# Patient Record
Sex: FEMALE | Race: WHITE | Employment: UNEMPLOYED | URBAN - METROPOLITAN AREA
[De-identification: names, ages, dates, MRNs, and addresses within clinical notes are randomized per-mention and may not be internally consistent; named-entity substitution may affect disease eponyms.]

---

## 2018-10-23 ENCOUNTER — HOSPITAL ENCOUNTER (INPATIENT)
Facility: CLINIC | Age: 18
LOS: 2 days | Discharge: HOME | DRG: 637 | End: 2018-10-25
Attending: EMERGENCY MEDICINE | Admitting: INTERNAL MEDICINE
Payer: COMMERCIAL

## 2018-10-23 ENCOUNTER — APPOINTMENT (OUTPATIENT)
Dept: GENERAL RADIOLOGY | Facility: CLINIC | Age: 18
DRG: 637 | End: 2018-10-23
Attending: EMERGENCY MEDICINE
Payer: COMMERCIAL

## 2018-10-23 DIAGNOSIS — E10.10 DIABETIC KETOACIDOSIS WITHOUT COMA ASSOCIATED WITH TYPE 1 DIABETES MELLITUS (H): ICD-10-CM

## 2018-10-23 DIAGNOSIS — L20.81 ATOPIC NEURODERMATITIS: Primary | ICD-10-CM

## 2018-10-23 DIAGNOSIS — E87.6 HYPOKALEMIA: ICD-10-CM

## 2018-10-23 DIAGNOSIS — B37.31 CANDIDIASIS OF VAGINA: ICD-10-CM

## 2018-10-23 PROBLEM — D72.829 LEUKOCYTOSIS: Status: ACTIVE | Noted: 2018-10-23

## 2018-10-23 PROBLEM — E83.42 HYPOMAGNESEMIA: Status: ACTIVE | Noted: 2018-10-23

## 2018-10-23 PROBLEM — E11.10 DKA (DIABETIC KETOACIDOSIS) (H): Status: RESOLVED | Noted: 2018-10-23 | Resolved: 2018-10-23

## 2018-10-23 PROBLEM — R11.2 NAUSEA WITH VOMITING: Status: ACTIVE | Noted: 2018-10-23

## 2018-10-23 PROBLEM — E11.10 DKA (DIABETIC KETOACIDOSIS) (H): Status: ACTIVE | Noted: 2018-10-23

## 2018-10-23 LAB
ALBUMIN SERPL-MCNC: 4 G/DL (ref 3.4–5)
ALBUMIN UR-MCNC: 30 MG/DL
ALP SERPL-CCNC: 119 U/L (ref 40–150)
ALT SERPL W P-5'-P-CCNC: 20 U/L (ref 0–50)
ANION GAP SERPL CALCULATED.3IONS-SCNC: 20 MMOL/L (ref 3–14)
ANION GAP SERPL CALCULATED.3IONS-SCNC: 23 MMOL/L (ref 3–14)
ANION GAP SERPL CALCULATED.3IONS-SCNC: 30 MMOL/L (ref 3–14)
ANION GAP SERPL CALCULATED.3IONS-SCNC: 31 MMOL/L (ref 3–14)
APPEARANCE UR: CLEAR
AST SERPL W P-5'-P-CCNC: 8 U/L (ref 0–45)
BACTERIA #/AREA URNS HPF: ABNORMAL /HPF
BASE DEFICIT BLDV-SCNC: 28.1 MMOL/L
BASOPHILS # BLD AUTO: 0.1 10E9/L (ref 0–0.2)
BASOPHILS NFR BLD AUTO: 0.4 %
BILIRUB DIRECT SERPL-MCNC: 0.1 MG/DL (ref 0–0.2)
BILIRUB SERPL-MCNC: 0.5 MG/DL (ref 0.2–1.3)
BILIRUB UR QL STRIP: NEGATIVE
BUN SERPL-MCNC: 11 MG/DL (ref 7–30)
BUN SERPL-MCNC: 11 MG/DL (ref 7–30)
BUN SERPL-MCNC: 14 MG/DL (ref 7–30)
BUN SERPL-MCNC: 14 MG/DL (ref 7–30)
CALCIUM SERPL-MCNC: 8.3 MG/DL (ref 8.5–10.1)
CALCIUM SERPL-MCNC: 8.6 MG/DL (ref 8.5–10.1)
CALCIUM SERPL-MCNC: 8.7 MG/DL (ref 8.5–10.1)
CALCIUM SERPL-MCNC: 9.5 MG/DL (ref 8.5–10.1)
CHLORIDE SERPL-SCNC: 101 MMOL/L (ref 94–109)
CHLORIDE SERPL-SCNC: 106 MMOL/L (ref 94–109)
CHLORIDE SERPL-SCNC: 117 MMOL/L (ref 94–109)
CHLORIDE SERPL-SCNC: 118 MMOL/L (ref 94–109)
CO2 SERPL-SCNC: 5 MMOL/L (ref 20–32)
CO2 SERPL-SCNC: 6 MMOL/L (ref 20–32)
CO2 SERPL-SCNC: 6 MMOL/L (ref 20–32)
CO2 SERPL-SCNC: 9 MMOL/L (ref 20–32)
COLOR UR AUTO: ABNORMAL
CREAT SERPL-MCNC: 0.52 MG/DL (ref 0.52–1.04)
CREAT SERPL-MCNC: 0.53 MG/DL (ref 0.52–1.04)
CREAT SERPL-MCNC: 0.54 MG/DL (ref 0.52–1.04)
CREAT SERPL-MCNC: 0.55 MG/DL (ref 0.52–1.04)
DIFFERENTIAL METHOD BLD: ABNORMAL
EOSINOPHIL # BLD AUTO: 0 10E9/L (ref 0–0.7)
EOSINOPHIL NFR BLD AUTO: 0.1 %
ERYTHROCYTE [DISTWIDTH] IN BLOOD BY AUTOMATED COUNT: 13.5 % (ref 10–15)
GFR SERPL CREATININE-BSD FRML MDRD: >90 ML/MIN/1.7M2
GLUCOSE BLDC GLUCOMTR-MCNC: 196 MG/DL (ref 70–99)
GLUCOSE BLDC GLUCOMTR-MCNC: 207 MG/DL (ref 70–99)
GLUCOSE BLDC GLUCOMTR-MCNC: 218 MG/DL (ref 70–99)
GLUCOSE BLDC GLUCOMTR-MCNC: 220 MG/DL (ref 70–99)
GLUCOSE BLDC GLUCOMTR-MCNC: 221 MG/DL (ref 70–99)
GLUCOSE BLDC GLUCOMTR-MCNC: 222 MG/DL (ref 70–99)
GLUCOSE BLDC GLUCOMTR-MCNC: 231 MG/DL (ref 70–99)
GLUCOSE BLDC GLUCOMTR-MCNC: 234 MG/DL (ref 70–99)
GLUCOSE BLDC GLUCOMTR-MCNC: 254 MG/DL (ref 70–99)
GLUCOSE BLDC GLUCOMTR-MCNC: 287 MG/DL (ref 70–99)
GLUCOSE BLDC GLUCOMTR-MCNC: 453 MG/DL (ref 70–99)
GLUCOSE BLDC GLUCOMTR-MCNC: 524 MG/DL (ref 70–99)
GLUCOSE BLDC GLUCOMTR-MCNC: 529 MG/DL (ref 70–99)
GLUCOSE SERPL-MCNC: 202 MG/DL (ref 70–99)
GLUCOSE SERPL-MCNC: 223 MG/DL (ref 70–99)
GLUCOSE SERPL-MCNC: 500 MG/DL (ref 70–99)
GLUCOSE SERPL-MCNC: 533 MG/DL (ref 70–99)
GLUCOSE UR STRIP-MCNC: >1000 MG/DL
HBA1C MFR BLD: 12.6 % (ref 0–5.6)
HCG SERPL QL: NEGATIVE
HCO3 BLDV-SCNC: 4 MMOL/L (ref 21–28)
HCT VFR BLD AUTO: 45.7 % (ref 35–47)
HGB BLD-MCNC: 16.2 G/DL (ref 11.7–15.7)
HGB UR QL STRIP: NEGATIVE
IMM GRANULOCYTES # BLD: 0.5 10E9/L (ref 0–0.4)
IMM GRANULOCYTES NFR BLD: 1.8 %
INTERPRETATION ECG - MUSE: NORMAL
KETONES UR STRIP-MCNC: >150 MG/DL
LEUKOCYTE ESTERASE UR QL STRIP: NEGATIVE
LIPASE SERPL-CCNC: 399 U/L (ref 73–393)
LYMPHOCYTES # BLD AUTO: 4.2 10E9/L (ref 0.8–5.3)
LYMPHOCYTES NFR BLD AUTO: 14.9 %
MAGNESIUM SERPL-MCNC: 1.3 MG/DL (ref 1.6–2.3)
MAGNESIUM SERPL-MCNC: 1.8 MG/DL (ref 1.6–2.3)
MCH RBC QN AUTO: 30.4 PG (ref 26.5–33)
MCHC RBC AUTO-ENTMCNC: 35.4 G/DL (ref 31.5–36.5)
MCV RBC AUTO: 86 FL (ref 78–100)
MONOCYTES # BLD AUTO: 1.2 10E9/L (ref 0–1.3)
MONOCYTES NFR BLD AUTO: 4.3 %
MUCOUS THREADS #/AREA URNS LPF: PRESENT /LPF
NEUTROPHILS # BLD AUTO: 21.9 10E9/L (ref 1.6–8.3)
NEUTROPHILS NFR BLD AUTO: 78.5 %
NITRATE UR QL: NEGATIVE
NRBC # BLD AUTO: 0 10*3/UL
NRBC BLD AUTO-RTO: 0 /100
OSMOLALITY SERPL: 338 MMOL/KG (ref 275–295)
OXYHGB MFR BLDV: 77 %
PCO2 BLDV: 21 MM HG (ref 40–50)
PH BLDV: 6.9 PH (ref 7.32–7.43)
PH UR STRIP: 5.5 PH (ref 5–7)
PHOSPHATE SERPL-MCNC: 3.5 MG/DL (ref 2.5–4.5)
PLATELET # BLD AUTO: 432 10E9/L (ref 150–450)
PO2 BLDV: 55 MM HG (ref 25–47)
POTASSIUM SERPL-SCNC: 2.5 MMOL/L (ref 3.4–5.3)
POTASSIUM SERPL-SCNC: 2.8 MMOL/L (ref 3.4–5.3)
POTASSIUM SERPL-SCNC: 3.3 MMOL/L (ref 3.4–5.3)
POTASSIUM SERPL-SCNC: 3.9 MMOL/L (ref 3.4–5.3)
PROT SERPL-MCNC: 8.3 G/DL (ref 6.8–8.8)
RBC # BLD AUTO: 5.33 10E12/L (ref 3.8–5.2)
RBC #/AREA URNS AUTO: <1 /HPF (ref 0–2)
SODIUM SERPL-SCNC: 137 MMOL/L (ref 133–144)
SODIUM SERPL-SCNC: 142 MMOL/L (ref 133–144)
SODIUM SERPL-SCNC: 146 MMOL/L (ref 133–144)
SODIUM SERPL-SCNC: 147 MMOL/L (ref 133–144)
SOURCE: ABNORMAL
SP GR UR STRIP: 1.02 (ref 1–1.03)
SQUAMOUS #/AREA URNS AUTO: 1 /HPF (ref 0–1)
UROBILINOGEN UR STRIP-MCNC: NORMAL MG/DL (ref 0–2)
WBC # BLD AUTO: 28 10E9/L (ref 4–11)
WBC #/AREA URNS AUTO: 1 /HPF (ref 0–5)

## 2018-10-23 PROCEDURE — 25000128 H RX IP 250 OP 636: Performed by: HOSPITALIST

## 2018-10-23 PROCEDURE — 25000125 ZZHC RX 250: Performed by: EMERGENCY MEDICINE

## 2018-10-23 PROCEDURE — 84100 ASSAY OF PHOSPHORUS: CPT | Performed by: INTERNAL MEDICINE

## 2018-10-23 PROCEDURE — 80048 BASIC METABOLIC PNL TOTAL CA: CPT | Performed by: HOSPITALIST

## 2018-10-23 PROCEDURE — 36415 COLL VENOUS BLD VENIPUNCTURE: CPT | Performed by: EMERGENCY MEDICINE

## 2018-10-23 PROCEDURE — 36415 COLL VENOUS BLD VENIPUNCTURE: CPT | Performed by: INTERNAL MEDICINE

## 2018-10-23 PROCEDURE — 96365 THER/PROPH/DIAG IV INF INIT: CPT

## 2018-10-23 PROCEDURE — 82805 BLOOD GASES W/O2 SATURATION: CPT | Performed by: EMERGENCY MEDICINE

## 2018-10-23 PROCEDURE — 84703 CHORIONIC GONADOTROPIN ASSAY: CPT | Performed by: EMERGENCY MEDICINE

## 2018-10-23 PROCEDURE — 20000003 ZZH R&B ICU

## 2018-10-23 PROCEDURE — 99291 CRITICAL CARE FIRST HOUR: CPT | Mod: 25

## 2018-10-23 PROCEDURE — 96376 TX/PRO/DX INJ SAME DRUG ADON: CPT

## 2018-10-23 PROCEDURE — 25000128 H RX IP 250 OP 636: Performed by: EMERGENCY MEDICINE

## 2018-10-23 PROCEDURE — 87040 BLOOD CULTURE FOR BACTERIA: CPT | Performed by: EMERGENCY MEDICINE

## 2018-10-23 PROCEDURE — 99223 1ST HOSP IP/OBS HIGH 75: CPT | Mod: AI | Performed by: INTERNAL MEDICINE

## 2018-10-23 PROCEDURE — 83735 ASSAY OF MAGNESIUM: CPT | Performed by: EMERGENCY MEDICINE

## 2018-10-23 PROCEDURE — 83690 ASSAY OF LIPASE: CPT | Performed by: EMERGENCY MEDICINE

## 2018-10-23 PROCEDURE — 93005 ELECTROCARDIOGRAM TRACING: CPT

## 2018-10-23 PROCEDURE — 80048 BASIC METABOLIC PNL TOTAL CA: CPT | Performed by: INTERNAL MEDICINE

## 2018-10-23 PROCEDURE — 25000132 ZZH RX MED GY IP 250 OP 250 PS 637: Performed by: INTERNAL MEDICINE

## 2018-10-23 PROCEDURE — 83036 HEMOGLOBIN GLYCOSYLATED A1C: CPT | Performed by: EMERGENCY MEDICINE

## 2018-10-23 PROCEDURE — 71046 X-RAY EXAM CHEST 2 VIEWS: CPT

## 2018-10-23 PROCEDURE — 25800025 ZZH RX 258: Performed by: INTERNAL MEDICINE

## 2018-10-23 PROCEDURE — 80076 HEPATIC FUNCTION PANEL: CPT | Performed by: EMERGENCY MEDICINE

## 2018-10-23 PROCEDURE — 83930 ASSAY OF BLOOD OSMOLALITY: CPT | Performed by: EMERGENCY MEDICINE

## 2018-10-23 PROCEDURE — 80048 BASIC METABOLIC PNL TOTAL CA: CPT | Performed by: EMERGENCY MEDICINE

## 2018-10-23 PROCEDURE — 25000132 ZZH RX MED GY IP 250 OP 250 PS 637: Performed by: EMERGENCY MEDICINE

## 2018-10-23 PROCEDURE — 96361 HYDRATE IV INFUSION ADD-ON: CPT

## 2018-10-23 PROCEDURE — 36415 COLL VENOUS BLD VENIPUNCTURE: CPT | Performed by: HOSPITALIST

## 2018-10-23 PROCEDURE — 99292 CRITICAL CARE ADDL 30 MIN: CPT

## 2018-10-23 PROCEDURE — 25000128 H RX IP 250 OP 636: Performed by: INTERNAL MEDICINE

## 2018-10-23 PROCEDURE — 96375 TX/PRO/DX INJ NEW DRUG ADDON: CPT

## 2018-10-23 PROCEDURE — 83735 ASSAY OF MAGNESIUM: CPT | Performed by: INTERNAL MEDICINE

## 2018-10-23 PROCEDURE — 81001 URINALYSIS AUTO W/SCOPE: CPT | Performed by: EMERGENCY MEDICINE

## 2018-10-23 PROCEDURE — 00000146 ZZHCL STATISTIC GLUCOSE BY METER IP

## 2018-10-23 PROCEDURE — 85025 COMPLETE CBC W/AUTO DIFF WBC: CPT | Performed by: EMERGENCY MEDICINE

## 2018-10-23 PROCEDURE — 25000131 ZZH RX MED GY IP 250 OP 636 PS 637: Performed by: INTERNAL MEDICINE

## 2018-10-23 RX ORDER — MAGNESIUM SULFATE HEPTAHYDRATE 40 MG/ML
2 INJECTION, SOLUTION INTRAVENOUS ONCE
Status: COMPLETED | OUTPATIENT
Start: 2018-10-23 | End: 2018-10-23

## 2018-10-23 RX ORDER — DEXTROSE MONOHYDRATE 25 G/50ML
25-50 INJECTION, SOLUTION INTRAVENOUS
Status: DISCONTINUED | OUTPATIENT
Start: 2018-10-23 | End: 2018-10-25 | Stop reason: HOSPADM

## 2018-10-23 RX ORDER — POTASSIUM CHLORIDE 29.8 MG/ML
20 INJECTION INTRAVENOUS
Status: DISCONTINUED | OUTPATIENT
Start: 2018-10-23 | End: 2018-10-24

## 2018-10-23 RX ORDER — DEXTROSE MONOHYDRATE, SODIUM CHLORIDE, AND POTASSIUM CHLORIDE 50; 1.49; 4.5 G/1000ML; G/1000ML; G/1000ML
INJECTION, SOLUTION INTRAVENOUS CONTINUOUS
Status: DISCONTINUED | OUTPATIENT
Start: 2018-10-23 | End: 2018-10-24

## 2018-10-23 RX ORDER — POTASSIUM CHLORIDE 7.45 MG/ML
10 INJECTION INTRAVENOUS
Status: DISCONTINUED | OUTPATIENT
Start: 2018-10-23 | End: 2018-10-24

## 2018-10-23 RX ORDER — SODIUM CHLORIDE 9 MG/ML
INJECTION, SOLUTION INTRAVENOUS CONTINUOUS
Status: DISCONTINUED | OUTPATIENT
Start: 2018-10-23 | End: 2018-10-23

## 2018-10-23 RX ORDER — POTASSIUM CHLORIDE 1500 MG/1
40 TABLET, EXTENDED RELEASE ORAL ONCE
Status: COMPLETED | OUTPATIENT
Start: 2018-10-23 | End: 2018-10-23

## 2018-10-23 RX ORDER — POTASSIUM CL/LIDO/0.9 % NACL 10MEQ/0.1L
10 INTRAVENOUS SOLUTION, PIGGYBACK (ML) INTRAVENOUS
Status: DISCONTINUED | OUTPATIENT
Start: 2018-10-23 | End: 2018-10-24

## 2018-10-23 RX ORDER — POTASSIUM CHLORIDE 1500 MG/1
20-40 TABLET, EXTENDED RELEASE ORAL
Status: DISCONTINUED | OUTPATIENT
Start: 2018-10-23 | End: 2018-10-24

## 2018-10-23 RX ORDER — SODIUM CHLORIDE 9 MG/ML
INJECTION, SOLUTION INTRAVENOUS ONCE
Status: COMPLETED | OUTPATIENT
Start: 2018-10-23 | End: 2018-10-23

## 2018-10-23 RX ORDER — POTASSIUM CL/LIDO/0.9 % NACL 10MEQ/0.1L
10 INTRAVENOUS SOLUTION, PIGGYBACK (ML) INTRAVENOUS
Status: COMPLETED | OUTPATIENT
Start: 2018-10-23 | End: 2018-10-23

## 2018-10-23 RX ORDER — NICOTINE POLACRILEX 4 MG
15-30 LOZENGE BUCCAL
Status: DISCONTINUED | OUTPATIENT
Start: 2018-10-23 | End: 2018-10-25 | Stop reason: HOSPADM

## 2018-10-23 RX ORDER — MAGNESIUM SULFATE HEPTAHYDRATE 500 MG/ML
2 INJECTION, SOLUTION INTRAMUSCULAR; INTRAVENOUS ONCE
Status: DISCONTINUED | OUTPATIENT
Start: 2018-10-23 | End: 2018-10-23

## 2018-10-23 RX ORDER — POTASSIUM CL/LIDO/0.9 % NACL 10MEQ/0.1L
10 INTRAVENOUS SOLUTION, PIGGYBACK (ML) INTRAVENOUS
Status: DISCONTINUED | OUTPATIENT
Start: 2018-10-23 | End: 2018-10-23

## 2018-10-23 RX ORDER — POTASSIUM CHLORIDE 1500 MG/1
40 TABLET, EXTENDED RELEASE ORAL
Status: COMPLETED | OUTPATIENT
Start: 2018-10-23 | End: 2018-10-23

## 2018-10-23 RX ORDER — BENZOCAINE/MENTHOL 6 MG-10 MG
LOZENGE MUCOUS MEMBRANE 2 TIMES DAILY PRN
Status: DISCONTINUED | OUTPATIENT
Start: 2018-10-23 | End: 2018-10-25 | Stop reason: HOSPADM

## 2018-10-23 RX ORDER — SODIUM CHLORIDE 9 MG/ML
INJECTION, SOLUTION INTRAVENOUS ONCE
Status: DISCONTINUED | OUTPATIENT
Start: 2018-10-23 | End: 2018-10-24

## 2018-10-23 RX ORDER — POTASSIUM CHLORIDE 1.5 G/1.58G
20-40 POWDER, FOR SOLUTION ORAL
Status: DISCONTINUED | OUTPATIENT
Start: 2018-10-23 | End: 2018-10-24

## 2018-10-23 RX ADMIN — SODIUM CHLORIDE: 9 INJECTION, SOLUTION INTRAVENOUS at 14:04

## 2018-10-23 RX ADMIN — POTASSIUM CHLORIDE, DEXTROSE MONOHYDRATE AND SODIUM CHLORIDE: 150; 5; 450 INJECTION, SOLUTION INTRAVENOUS at 15:07

## 2018-10-23 RX ADMIN — SODIUM CHLORIDE: 9 INJECTION, SOLUTION INTRAVENOUS at 09:08

## 2018-10-23 RX ADMIN — INSULIN GLARGINE 20 UNITS: 100 INJECTION, SOLUTION SUBCUTANEOUS at 22:02

## 2018-10-23 RX ADMIN — Medication 10 MEQ: at 10:17

## 2018-10-23 RX ADMIN — POTASSIUM CHLORIDE 40 MEQ: 1500 TABLET, EXTENDED RELEASE ORAL at 10:30

## 2018-10-23 RX ADMIN — POTASSIUM CHLORIDE 40 MEQ: 1500 TABLET, EXTENDED RELEASE ORAL at 08:51

## 2018-10-23 RX ADMIN — Medication 10 MEQ: at 08:06

## 2018-10-23 RX ADMIN — SODIUM CHLORIDE 1000 ML: 9 INJECTION, SOLUTION INTRAVENOUS at 08:00

## 2018-10-23 RX ADMIN — POTASSIUM CHLORIDE 40 MEQ: 1500 TABLET, EXTENDED RELEASE ORAL at 12:32

## 2018-10-23 RX ADMIN — Medication 10 MEQ: at 12:34

## 2018-10-23 RX ADMIN — Medication 10 MEQ: at 09:08

## 2018-10-23 RX ADMIN — SODIUM CHLORIDE: 9 INJECTION, SOLUTION INTRAVENOUS at 10:50

## 2018-10-23 RX ADMIN — SODIUM CHLORIDE 5 UNITS/HR: 9 INJECTION, SOLUTION INTRAVENOUS at 20:42

## 2018-10-23 RX ADMIN — Medication 10 MEQ: at 23:35

## 2018-10-23 RX ADMIN — SODIUM BICARBONATE 50 MEQ: 84 INJECTION, SOLUTION INTRAVENOUS at 08:19

## 2018-10-23 RX ADMIN — Medication 10 MEQ: at 11:27

## 2018-10-23 RX ADMIN — SODIUM CHLORIDE 1000 ML: 9 INJECTION, SOLUTION INTRAVENOUS at 07:03

## 2018-10-23 RX ADMIN — MAGNESIUM SULFATE IN WATER 2 G: 40 INJECTION, SOLUTION INTRAVENOUS at 08:23

## 2018-10-23 ASSESSMENT — ENCOUNTER SYMPTOMS
CHILLS: 1
VOMITING: 1
APPETITE CHANGE: 1
DIARRHEA: 1
CONFUSION: 1
ACTIVITY CHANGE: 1
FATIGUE: 1
NAUSEA: 1

## 2018-10-23 ASSESSMENT — ACTIVITIES OF DAILY LIVING (ADL)
ADLS_ACUITY_SCORE: 16
ADLS_ACUITY_SCORE: 15
ADLS_ACUITY_SCORE: 15

## 2018-10-23 NOTE — H&P
"[facility]    History and Physical  Hospitalist       Date of Admission:  10/23/2018    Assessment & Plan   18 year old female with Type 1 DM x 7 years with frequent episodes of DKA, who presents with elevated glucose values for 2-3 days per MiniMed Sensor (does not check herself), nausea yesterday and not nausea and vomiting this AM and confusion with glucose 550 and Bicarb 5 and pH 6.9:    Summary:    Principal Problem:    Severe DKA w type 1 DM -- and poor baseline control with Hgb A1C 12.6 on 10/23/18    Active Problems:    Hypokalemia, severe -- related to DKA and nausea and vomiting    Nausea with vomiting    Hypomagnesemia    Leukocytosis, suspect stress related   -- UA with 1 WBC, Urine culture pending    Confusion probably related to Metabolic Encephalopahy    Multiple skin lesions -- suspect neurodermatitis   -- 1% hydrocortisone bid prn itching        Plan: Admit to ICU, aggressive IV hydration, replace potassium and start IV insulin drip.  Discussed with ER physician -- will give BiCarb given ph < 7.0.  Discussed with mother and father -- neither know the settings of her insulin pump (her father says she is \"protective of this information\").  Monitor neurologic status -- anticipate confusion to clear with treatment of DKA.      DVT Prophylaxis: Pneumatic Compression Devices  Code Status: Full Code    Disposition: Expected discharge in 2-3 days once DKA clears.    Hunter Matson MD  Pager: [phone]  Cell Phone:  [phone]     Primary Care Physician   No primary care provider on file.    Chief Complaint   Confusion, Nausea, Vomiting    History is obtained from Patient and mother and father    History of Present Illness   18 year old female with DM Type 1 for 7 years, frequent episodes of DKA so got [employer] insulin pump with sensor in March 2018, but continues to have control problems but mother admits they do not do glucometer checks (have just been going by the MiniMed " "sensor) and mother admits she does not know how to manage the insulin pump, and daughter too confused to do it herself.  They are visiting from [place], she has not felt good the last 3 days, but has not had high blood sugars (< 300), but yesterday had nausea and today nausea with vomiting and confusion, and glucose per med sensor 300 but in ER was 550.  Unclear if pump functioning appropriately, but the patient is not able to manage it at present, and mother not able to manage it as well.  She did have dysuria the last 2 days so mother did get \"Azo\" OTC (cranberry pills) to use. No reported fever or chills.  Hgb A1C today is 12.6.     Past Medical History    I have reviewed this patient's medical history and updated it with pertinent information if needed.   Past Medical History:   Diagnosis Date     DKA (diabetic ketoacidosis) (H)     3-4 episodes this past year     Type I diabetes mellitus (H) 2011       Past Surgical History   I have reviewed this patient's surgical history and updated it with pertinent information if needed.  History reviewed. No pertinent surgical history.    Prior to Admission Medications   None     Allergies   No Known Allergies    Social History   I have reviewed this patient's social history and updated it with pertinent information if needed. Bernie Dong  reports that she has never smoked. She has never used smokeless tobacco. She reports that she does not drink alcohol.    Family History   I have reviewed this patient's family history and updated it with pertinent information if needed.   Family History   Problem Relation Age of Onset     No Known Problems Mother      No Known Problems Father        Review of Systems   Review of systems not obtained due to patient factors - confusion    # Pain Assessment:  Current Pain Score 10/23/2018   Pain score (0-10) 0   Bernie s pain level was assessed and she currently denies pain.        Physical Exam   Temp: 96.9  F (36.1  C) Temp src: " Axillary BP: 144/83 Pulse: 140 Heart Rate: 142 Resp: 29 SpO2: 100 % O2 Device: None (Room air)    Vital Signs with Ranges  Temp:  [96.7  F (35.9  C)-97.7  F (36.5  C)] 96.9  F (36.1  C)  Pulse:  [140-150] 140  Heart Rate:  [141-149] 142  Resp:  [18-40] 29  BP: (118-144)/(62-97) 144/83  SpO2:  [97 %-100 %] 100 %  138 lbs 7.18 oz    Constitutional: Awake but lethargic, semi-cooperative, appears uncomfortable but can't clearly state what's wrong.  Eyes: Conjunctiva and pupils examined and normal.  HEENT: Moist mucous membranes, normal dentition.  Respiratory: Clear to auscultation bilaterally, no crackles or wheezing.  Cardiovascular: Regular rate and rhythm, normal S1 and S2, and no murmur noted,   No carotid bruits.  No ankle edema.   GI: Soft, non-distended, non-tender, normal bowel sounds.  Lymph/Hematologic: No anterior cervical, supraclavicular or axillary adenopathy.  Skin: No rashes, no cyanosis.  Multiple excoriated areas on face and arms.    Musculoskeletal: No joint swelling, erythema or tenderness.  Neurologic: Cranial nerves 2-12 intact, generalized weakness, moving all extremities.   Psychiatric: Lethargic, Ox1.5 (has difficulty focusing long enough to answer a questions).    Data   Data reviewed today:  I personally reviewed the EKG tracing showing sinus tach with rate 144.    Recent Labs  Lab 10/23/18  1022 10/23/18  0640   WBC  --  28.0*   HGB  --  16.2*   MCV  --  86   PLT  --  432    137   POTASSIUM 2.8* 2.5*   CHLORIDE 106 101   CO2 6* 5*   BUN 14 14   CR 0.54 0.55   ANIONGAP 30* 31*   ERICK 8.7 9.5   * 533*   ALBUMIN  --  4.0   PROTTOTAL  --  8.3   BILITOTAL  --  0.5   ALKPHOS  --  119   ALT  --  20   AST  --  8   LIPASE  --  399*       Imaging:  Recent Results (from the past 24 hour(s))   XR Chest 2 Views    Narrative    XR CHEST 2 VW 10/23/2018 8:03 AM    HISTORY: Diabetic ketoacidosis. Elevated white blood cell count.    COMPARISON: None.    FINDINGS: No airspace consolidation,  pleural effusion or pneumothorax.  Normal heart size.      Impression    IMPRESSION: No evidence of pneumonia.    MARTY ZEE MD

## 2018-10-23 NOTE — IP AVS SNAPSHOT
[address]    [place] [address]    Phone:  [phone]                                       After Visit Summary   10/23/2018    Bernie Dong    MRN: [id number]           After Visit Summary Signature Page     I have received my discharge instructions, and my questions have been answered. I have discussed any challenges I see with this plan with the nurse or doctor.    ..........................................................................................................................................  Patient/Patient Representative Signature      ..........................................................................................................................................  Patient Representative Print Name and Relationship to Patient    ..................................................               ................................................  Date                                   Time    ..........................................................................................................................................  Reviewed by Signature/Title    ...................................................              ..............................................  Date                                               Time          22EPIC Rev 08/18

## 2018-10-23 NOTE — ED NOTES
DATE:  10/23/2018   TIME OF RECEIPT FROM LAB:   0710  LAB TEST & VALUE:   Potassium 2.5, Co2 5, Glucose 533  RESULTS GIVEN WITH READ-BACK TO (PROVIDER):  Artemio Aly MD  TIME LAB VALUE REPORTED TO PROVIDER:   0713

## 2018-10-23 NOTE — PLAN OF CARE
Problem: Diabetes, Type 1 (Adult)  Goal: Signs and Symptoms of Listed Potential Problems Will be Absent, Minimized or Managed (Diabetes, Type 1)  Signs and symptoms of listed potential problems will be absent, minimized or managed by discharge/transition of care (reference Diabetes, Type 1 (Adult) CPG).  Lethargic but arouses to voice and oriented. Speech garbled, mouth dry despite oral hydration. Last BG in 200's, on 2.5 units of insulin/hr, last K 3.9. d5 .45NS 20 K infusing at 100 ml/hr. Up to the commode with large UOP. Is hungry and ordered dinner but has fallen back to sleep. Tolerating clears. Skin is full of scabs and scars. Mother is at bedside.

## 2018-10-23 NOTE — ED PROVIDER NOTES
History     Chief Complaint:  High blood sugar     HPI   History is provided primarily by mother at bedside due to patient acuity.     Bernie Dong is a 18 year old female who presents with high blood sugar. The patient's mother states that they are currently visiting from [place] where the patient receives most of her care. She has had hard to control Type 1 diabetes and was recently started on an Insulin pump. Over the past several days the patient notes that she has been having some general feeling of being unwell with some nausea. Repeat glucose checks by the pump showed that the patient was never over 300, which is somewhat high for her. However, last night the patient began feeling significantly worse with hot/cold flashes as well as profuse nausea. This morning the patient and mother were en route to the airport to leave for home when she started gagging and vomited at least once. The insulin pump was still reading around 300 per the mother. The patient became increasingly confused and weak and by the time of arriving to the airport, the patient was unable to stand under her own weight and required assistance getting out of the car. At that point EMS was called and the patient was transported here for evaluation. EMS found a blood glucose of 550 by finger prick. She was given 4 mg Zofran and 400 mL of normal saline en route. She had a heart rate in the 140s. The patient states that she feels thirsty here currently and complains of diarrhea though she mentions that she took Ex-Lax last night as she was constipated prior to this. The patient was given 20 units of her Humalog Insulin this morning but, per the pump and mother report, has not received further Insulin boluses.    Allergies:  No known drug allergies     Medications:    Albuterol inhaler  Insulin via pump  Humalog    Past Medical History:    Type 1 diabetes  Asthma  Anxiety    Past Surgical History:    History reviewed. No pertinent  surgical history.     Family History:    History reviewed. No pertinent family history.      Social History:  Lives in [place]  Presents with mother     Review of Systems   Constitutional: Positive for activity change, appetite change, chills and fatigue.   Gastrointestinal: Positive for diarrhea, nausea and vomiting.   Psychiatric/Behavioral: Positive for confusion.   All other systems reviewed and are negative.      Physical Exam     Patient Vitals for the past 24 hrs:   BP Temp Temp src Pulse Heart Rate Resp SpO2   10/23/18 0830 121/68 - - 140 - 18 99 %   10/23/18 0825 133/71 - - - 149 22 97 %   10/23/18 0750 134/80 - - - 141 24 98 %   10/23/18 0632 (!) 138/97 97.7  F (36.5  C) Oral 150 - (!) 31 99 %   10/23/18 0625 (!) 138/97 - - - - - -        Physical Exam  GENERAL: well developed, pleasant  HEAD: atraumatic  EYES: pupils reactive, extraocular muscles intact, conjunctivae normal  ENT:  mucus membranes dry  NECK:  trachea midline, normal range of motion  RESPIRATORY: no tachypnea, breath sounds clear to auscultation   CVS: normal S1/S2, no murmurs, intact distal pulses. Tachycardic  ABDOMEN: soft, nontender, nondistention  MUSCULOSKELETAL: no deformities  SKIN: warm and dry, no acute rashes or ulceration  NEURO: GCS 15, cranial nerves intact. Mild confusion at times but alert and oriented x3  PSYCH:  Mood/affect normal     Emergency Department Course     ECG (7:29:00):  Rate 144 bpm. MT interval 128. QRS duration 82. QT/QTc 348/538. P-R-T axes 64 65 66. Sinus tachycardia. Nonspecific ST and T wave abnormality. Abnormal ECG. Interpreted at 0732 by Artemio Aly MD.     Imaging:  Radiographic findings were communicated with the patient who voiced understanding of the findings.    X-ray Chest, 2 views:  No evidence of pneumonia.  Result per radiology.     Laboratory:  CBC: WBC 28.0 (H), HGB 16.2 (H), o/w WNL ()   BMP: Glucose 533 (HH), K 2.5 (LL), CO2 5 (LL), Anion gap 31 (H) ,o/w WNL (Creatinine 0.55)    0630 - Glucose: 524 (HH)  Lipase: 399 (H)  Hepatic panel: WNL  Magnesium: 1.3 (L)  Hemoglobin A1c: 12.6 (H)  Osmolality: 338 (H)  HCG: Negative  UA: Glucose >1000, Ketone >150, Albumin 30, Few bacteria, mucous present, o/w negative  Blood Culture x2: Pending   Venous Blood Gas  Lab 10/23/18  0745   PHV 6.90*   PCO2V 21*   PO2V 55*   HCO3V 4*      Interventions:  0703 - NS 1L IV Bolus   0800 -NS 1L IV Bolus   0806 - K-Cl 10 mEq IV  0819 - Sodium bicarbonate 50 mEq IV  0823 - Magnesium sulfate 2g IVPB  0851 - K-Dur 40 mEq PO    Emergency Department Course:  The patient arrived in the emergency department via EMS.   Past medical records, nursing notes, and vitals reviewed.  0636: I performed an exam of the patient and obtained history, as documented above.    IV inserted and blood drawn. The patient was placed on continuous cardiac monitoring and pulse oximetry.     0722: I rechecked the patient. Explained findings to patient and mother.     0730: Patient had ripped out one of her IVs.    Findings and plan explained to the Patient and mother who consents to admission.     0745: Discussed the patient with Dr. Morrow, who will admit the patient to a ICU bed for further monitoring, evaluation, and treatment.      Impression & Plan      Medical Decision Making:  Patient presents with elevated blood sugars, vomiting and not feeling well since yesterday.  Patient has a history of type 1 diabetes with prior admissions for DKA.  She does have a pump but suspects that the pump is not working properly or reading properly.  Mom does not know how to use the pump and has been trying to allow the patient to be in control of her health.  There is no suspicion for drugs or alcohol.  They are up here visiting for family vacation.  Patient has some mild confusion although she knows person place and date.  Labs show a low potassium of 2.5, low magnesium, elevated blood sugar and other abnormalities as noted above.  Given the  hypokalemia patient received IV potassium as well as oral potassium before insulin can be started.  She was also given IV fluids.  Patient had 2 IVs established although she did accidentally pull out 1 of them.  Her insulin pump has been removed as well.  Mother states that she has been having some urinary symptoms and has been taking Azo although her urinalysis is negative.  Her WBC count is high likely stress response.  Urinalysis is negative.  Abdominal exam is benign.  Currently waiting on a chest x-ray.    Diagnosis:    ICD-10-CM   1. Diabetic ketoacidosis without coma associated with type 1 diabetes mellitus (H) E10.10   2. Hypokalemia E87.6     Benoit Cash  10/23/2018    EMERGENCY DEPARTMENT  Benoit ABREU am serving as a scribe at 6:36 AM on 10/23/2018 to document services personally performed by Artemio Aly MD based on my observations and the provider's statements to me.       Artemio Aly MD  10/23/18 5221

## 2018-10-23 NOTE — ED NOTES
Bed: ED06  Expected date: 10/23/18  Expected time: 6:11 AM  Means of arrival:   Comments:  [address]odora [address]  18F  Hyperglycemia/Tachycardia/HTN

## 2018-10-23 NOTE — IP AVS SNAPSHOT
MRN:[id number]                      After Visit Summary   10/23/2018    Bernie Dong    MRN: [id number]           Thank you!     Thank you for choosing [facility] for your care. Our goal is always to provide you with excellent care. Hearing back from our patients is one way we can continue to improve our services. Please take a few minutes to complete the written survey that you may receive in the mail after you visit with us. Thank you!        Patient Information     Date Of Birth          2000        Designated Caregiver       Most Recent Value    Caregiver    Will someone help with your care after discharge? yes    Name of designated caregiver omar    Phone number of caregiver [phone]    Caregiver address [address] [place] [id number]      About your hospital stay     You were admitted on:  October 23, 2018 You last received care in the:  [address]    You were discharged on:  October 25, 2018        Reason for your hospital stay       DKA                  Who to Call     For medical emergencies, please call 911.  For non-urgent questions about your medical care, please call your primary care provider or clinic, None          Attending Provider     Provider Specialty    Artemio Aly MD Emergency Medicine    Odalys, Hunter Calderon MD Internal Medicine       Primary Care Provider Fax #    Physician No Ref-Primary [phone]      After Care Instructions     Activity       Your activity upon discharge: activity as tolerated            Diet       Follow this diet upon discharge: Orders Placed This Encounter      Moderate Consistent CHO Diet            Discharge Instructions       Call Dr. Morrow at Pager [phone] if questions, or Cell Phone [phone].                  Follow-up Appointments     Follow-up and recommended labs and tests        Follow up with primary care provider in 3-5 days, review blood sugar results and adjust insulin as  "needed to improve control.                  Pending Results     Date and Time Order Name Status Description    10/23/2018 0719 Blood culture Preliminary     10/23/2018 0719 Blood culture Preliminary             Statement of Approval     Ordered          10/25/18 1001  I have reviewed and agree with all the recommendations and orders detailed in this document.  EFFECTIVE NOW     Approved and electronically signed by:  Hunter Morrow MD             Admission Information     Date & Time Provider Department Dept. Phone    10/23/2018 Hunter Morrow MD [place] [address] Oncology [phone]      Your Vitals Were     Blood Pressure Pulse Temperature Respirations Weight Last Period    113/78 (BP Location: Right arm) 140 96.5  F (35.8  C) (Oral) 16 62.8 kg (138 lb 7.2 oz) 10/08/2018 (Approximate)    Pulse Oximetry                   100%           MyChart Information     [facility] lets you send messages to your doctor, view your test results, renew your prescriptions, schedule appointments and more. To sign up, go to [email or web address] . Click on \"Log in\" on the left side of the screen, which will take you to the Welcome page. Then click on \"Sign up Now\" on the right side of the page.     You will be asked to enter the access code listed below, as well as some personal information. Please follow the directions to create your username and password.     Your access code is: [email or web address]  Expires: 2019 11:51 AM     Your access code will  in 90 days. If you need help or a new code, please call your [facility] clinic or [phone].        Care EveryWhere ID     This is your Care EveryWhere ID. This could be used by other organizations to access your [facility] medical records  [id number]        Equal Access to Services     [facility] : Dwayne Santana, elina melgar, neelima light. So [facility] [phone].    ATENCIÓN: Si " arabellala vane, tiene a fuentes disposición servicios gratuitos de asistencia lingüística. Gaby solis [phone].    We comply with applicable federal civil rights laws and [place] laws. We do not discriminate on the basis of race, color, national origin, age, disability, sex, sexual orientation, or gender identity.               Review of your medicines      START taking        Dose / Directions    hydrocortisone 1 % cream   Commonly known as:  CORTAID   Used for:  Atopic neurodermatitis        Apply topically 2 times daily as needed for itching   Quantity:  15 g   Refills:  0       insulin aspart 100 UNIT/ML injection   Commonly known as:  NovoLOG FLEXPEN   Used for:  Diabetic ketoacidosis without coma associated with type 1 diabetes mellitus (H)        Novolog Flexpen, 2 units per 15 gram carb unit before breakfast, lunch and dinner, and additional for glucometer 150-200, give 1 units SQ, 201-250 2 units, 251-300 3 units, 301-350 4 units, >350 5 units.   Quantity:  15 mL   Refills:  0       insulin glargine 100 UNIT/ML injection   Commonly known as:  LANTUS   Used for:  Diabetic ketoacidosis without coma associated with type 1 diabetes mellitus (H)        Dose:  20 Units   Inject 20 Units Subcutaneous At Bedtime   Quantity:  15 mL   Refills:  3       miconazole 2 % cream   Commonly known as:  MICATIN        Dose:  1 applicator   Place 1 applicator vaginally At Bedtime for 7 days   Quantity:  45 g   Refills:  0         STOP taking     UNKNOWN TO PATIENT                Where to get your medicines      These medications were sent to [facility] - [address]  [address] [facility] [address], [place] [address]     Phone:  [phone]     hydrocortisone 1 % cream    insulin glargine 100 UNIT/ML injection    miconazole 2 % cream         Some of these will need a paper prescription and others can be bought over the counter. Ask your nurse if you have questions.     Bring a paper prescription for  each of these medications     insulin aspart 100 UNIT/ML injection                Protect others around you: Learn how to safely use, store and throw away your medicines at www.disposemymeds.org.             Medication List: This is a list of all your medications and when to take them. Check marks below indicate your daily home schedule. Keep this list as a reference.      Medications           Morning Afternoon Evening Bedtime As Needed    hydrocortisone 1 % cream   Commonly known as:  CORTAID   Apply topically 2 times daily as needed for itching                                insulin aspart 100 UNIT/ML injection   Commonly known as:  NovoLOG FLEXPEN   Novolog Flexpen, 2 units per 15 gram carb unit before breakfast, lunch and dinner, and additional for glucometer 150-200, give 1 units SQ, 201-250 2 units, 251-300 3 units, 301-350 4 units, >350 5 units.   Last time this was given:  5 Units on 10/25/2018  8:59 AM                                insulin glargine 100 UNIT/ML injection   Commonly known as:  LANTUS   Inject 20 Units Subcutaneous At Bedtime   Last time this was given:  20 Units on 10/24/2018 10:03 PM                                miconazole 2 % cream   Commonly known as:  MICATIN   Place 1 applicator vaginally At Bedtime for 7 days   Last time this was given:  1 applicator on 10/24/2018 10:05 PM

## 2018-10-23 NOTE — ED NOTES
Patient had large soft bowel movement upon arrival to ED.  Dry heaving, no vomiting.  Tachypnea.  Mom states she gave her an ex lax last night due to some constipation.

## 2018-10-23 NOTE — ED NOTES
EMS arrival:    Coming from airport.  Insulin dependent diabetic.  Type 1.  Reading high, then 550s.  Gave self 20 units of Humalog.  Insulin pump reading different, reading 300s for glucose & is giving 0 units of insulin.  Vomiting.  4 mg zofran.  20 g to left handf.  400 ml NS.  Last time felt normal was yesterday afternoon.  BP 150s, HR 140s.

## 2018-10-24 PROBLEM — B37.31 CANDIDIASIS OF VAGINA: Status: ACTIVE | Noted: 2018-10-24

## 2018-10-24 LAB
ANION GAP SERPL CALCULATED.3IONS-SCNC: 14 MMOL/L (ref 3–14)
ANION GAP SERPL CALCULATED.3IONS-SCNC: 16 MMOL/L (ref 3–14)
ANION GAP SERPL CALCULATED.3IONS-SCNC: 24 MMOL/L (ref 3–14)
BUN SERPL-MCNC: 10 MG/DL (ref 7–30)
BUN SERPL-MCNC: 8 MG/DL (ref 7–30)
BUN SERPL-MCNC: 8 MG/DL (ref 7–30)
CALCIUM SERPL-MCNC: 8.4 MG/DL (ref 8.5–10.1)
CALCIUM SERPL-MCNC: 8.5 MG/DL (ref 8.5–10.1)
CALCIUM SERPL-MCNC: 8.7 MG/DL (ref 8.5–10.1)
CHLORIDE SERPL-SCNC: 108 MMOL/L (ref 94–109)
CHLORIDE SERPL-SCNC: 111 MMOL/L (ref 94–109)
CHLORIDE SERPL-SCNC: 113 MMOL/L (ref 94–109)
CO2 SERPL-SCNC: 14 MMOL/L (ref 20–32)
CO2 SERPL-SCNC: 19 MMOL/L (ref 20–32)
CO2 SERPL-SCNC: 8 MMOL/L (ref 20–32)
CREAT SERPL-MCNC: 0.4 MG/DL (ref 0.52–1.04)
CREAT SERPL-MCNC: 0.5 MG/DL (ref 0.52–1.04)
CREAT SERPL-MCNC: 0.51 MG/DL (ref 0.52–1.04)
ERYTHROCYTE [DISTWIDTH] IN BLOOD BY AUTOMATED COUNT: 13.6 % (ref 10–15)
GFR SERPL CREATININE-BSD FRML MDRD: >90 ML/MIN/1.7M2
GLUCOSE BLDC GLUCOMTR-MCNC: 148 MG/DL (ref 70–99)
GLUCOSE BLDC GLUCOMTR-MCNC: 150 MG/DL (ref 70–99)
GLUCOSE BLDC GLUCOMTR-MCNC: 168 MG/DL (ref 70–99)
GLUCOSE BLDC GLUCOMTR-MCNC: 169 MG/DL (ref 70–99)
GLUCOSE BLDC GLUCOMTR-MCNC: 184 MG/DL (ref 70–99)
GLUCOSE BLDC GLUCOMTR-MCNC: 191 MG/DL (ref 70–99)
GLUCOSE BLDC GLUCOMTR-MCNC: 193 MG/DL (ref 70–99)
GLUCOSE BLDC GLUCOMTR-MCNC: 207 MG/DL (ref 70–99)
GLUCOSE BLDC GLUCOMTR-MCNC: 216 MG/DL (ref 70–99)
GLUCOSE BLDC GLUCOMTR-MCNC: 241 MG/DL (ref 70–99)
GLUCOSE BLDC GLUCOMTR-MCNC: 245 MG/DL (ref 70–99)
GLUCOSE BLDC GLUCOMTR-MCNC: 255 MG/DL (ref 70–99)
GLUCOSE BLDC GLUCOMTR-MCNC: 266 MG/DL (ref 70–99)
GLUCOSE BLDC GLUCOMTR-MCNC: 335 MG/DL (ref 70–99)
GLUCOSE SERPL-MCNC: 197 MG/DL (ref 70–99)
GLUCOSE SERPL-MCNC: 239 MG/DL (ref 70–99)
GLUCOSE SERPL-MCNC: 240 MG/DL (ref 70–99)
HCT VFR BLD AUTO: 38.7 % (ref 35–47)
HGB BLD-MCNC: 14 G/DL (ref 11.7–15.7)
LACTATE BLD-SCNC: 1.1 MMOL/L (ref 0.7–2)
MAGNESIUM SERPL-MCNC: 1.1 MG/DL (ref 1.6–2.3)
MAGNESIUM SERPL-MCNC: 1.9 MG/DL (ref 1.6–2.3)
MCH RBC QN AUTO: 29.5 PG (ref 26.5–33)
MCHC RBC AUTO-ENTMCNC: 36.2 G/DL (ref 31.5–36.5)
MCV RBC AUTO: 82 FL (ref 78–100)
PHOSPHATE SERPL-MCNC: 0.3 MG/DL (ref 2.5–4.5)
PHOSPHATE SERPL-MCNC: 1 MG/DL (ref 2.5–4.5)
PLATELET # BLD AUTO: 263 10E9/L (ref 150–450)
POTASSIUM SERPL-SCNC: 2.6 MMOL/L (ref 3.4–5.3)
POTASSIUM SERPL-SCNC: 3 MMOL/L (ref 3.4–5.3)
POTASSIUM SERPL-SCNC: 3.2 MMOL/L (ref 3.4–5.3)
RBC # BLD AUTO: 4.74 10E12/L (ref 3.8–5.2)
SODIUM SERPL-SCNC: 141 MMOL/L (ref 133–144)
SODIUM SERPL-SCNC: 143 MMOL/L (ref 133–144)
SODIUM SERPL-SCNC: 143 MMOL/L (ref 133–144)
WBC # BLD AUTO: 14.2 10E9/L (ref 4–11)

## 2018-10-24 PROCEDURE — 25000128 H RX IP 250 OP 636: Performed by: INTERNAL MEDICINE

## 2018-10-24 PROCEDURE — 25000131 ZZH RX MED GY IP 250 OP 636 PS 637: Performed by: INTERNAL MEDICINE

## 2018-10-24 PROCEDURE — 00000146 ZZHCL STATISTIC GLUCOSE BY METER IP

## 2018-10-24 PROCEDURE — 83605 ASSAY OF LACTIC ACID: CPT | Performed by: INTERNAL MEDICINE

## 2018-10-24 PROCEDURE — 84100 ASSAY OF PHOSPHORUS: CPT | Performed by: INTERNAL MEDICINE

## 2018-10-24 PROCEDURE — 36415 COLL VENOUS BLD VENIPUNCTURE: CPT | Performed by: HOSPITALIST

## 2018-10-24 PROCEDURE — 36415 COLL VENOUS BLD VENIPUNCTURE: CPT | Performed by: INTERNAL MEDICINE

## 2018-10-24 PROCEDURE — 25000128 H RX IP 250 OP 636: Performed by: HOSPITALIST

## 2018-10-24 PROCEDURE — 25000132 ZZH RX MED GY IP 250 OP 250 PS 637: Performed by: HOSPITALIST

## 2018-10-24 PROCEDURE — 25000132 ZZH RX MED GY IP 250 OP 250 PS 637: Performed by: INTERNAL MEDICINE

## 2018-10-24 PROCEDURE — 83735 ASSAY OF MAGNESIUM: CPT | Performed by: INTERNAL MEDICINE

## 2018-10-24 PROCEDURE — 99233 SBSQ HOSP IP/OBS HIGH 50: CPT | Performed by: INTERNAL MEDICINE

## 2018-10-24 PROCEDURE — 25800025 ZZH RX 258: Performed by: INTERNAL MEDICINE

## 2018-10-24 PROCEDURE — 25000125 ZZHC RX 250: Performed by: INTERNAL MEDICINE

## 2018-10-24 PROCEDURE — 80048 BASIC METABOLIC PNL TOTAL CA: CPT | Performed by: HOSPITALIST

## 2018-10-24 PROCEDURE — 80048 BASIC METABOLIC PNL TOTAL CA: CPT | Performed by: INTERNAL MEDICINE

## 2018-10-24 PROCEDURE — 85027 COMPLETE CBC AUTOMATED: CPT | Performed by: INTERNAL MEDICINE

## 2018-10-24 PROCEDURE — 12000000 ZZH R&B MED SURG/OB

## 2018-10-24 RX ORDER — POTASSIUM CHLORIDE 1500 MG/1
40 TABLET, EXTENDED RELEASE ORAL EVERY 4 HOURS
Status: COMPLETED | OUTPATIENT
Start: 2018-10-24 | End: 2018-10-24

## 2018-10-24 RX ORDER — POTASSIUM CHLORIDE 1500 MG/1
40 TABLET, EXTENDED RELEASE ORAL
Status: COMPLETED | OUTPATIENT
Start: 2018-10-24 | End: 2018-10-24

## 2018-10-24 RX ORDER — MAGNESIUM SULFATE HEPTAHYDRATE 40 MG/ML
4 INJECTION, SOLUTION INTRAVENOUS EVERY 4 HOURS PRN
Status: DISCONTINUED | OUTPATIENT
Start: 2018-10-24 | End: 2018-10-24

## 2018-10-24 RX ORDER — MAGNESIUM SULFATE HEPTAHYDRATE 40 MG/ML
2 INJECTION, SOLUTION INTRAVENOUS EVERY 4 HOURS
Status: DISCONTINUED | OUTPATIENT
Start: 2018-10-24 | End: 2018-10-24

## 2018-10-24 RX ORDER — MICONAZOLE NITRATE 2 %
1 CREAM WITH APPLICATOR VAGINAL AT BEDTIME
Status: DISCONTINUED | OUTPATIENT
Start: 2018-10-24 | End: 2018-10-25 | Stop reason: HOSPADM

## 2018-10-24 RX ADMIN — POTASSIUM CHLORIDE 40 MEQ: 1500 TABLET, EXTENDED RELEASE ORAL at 06:40

## 2018-10-24 RX ADMIN — MAGNESIUM SULFATE IN WATER 4 G: 40 INJECTION, SOLUTION INTRAVENOUS at 07:48

## 2018-10-24 RX ADMIN — INSULIN GLARGINE 20 UNITS: 100 INJECTION, SOLUTION SUBCUTANEOUS at 22:03

## 2018-10-24 RX ADMIN — POTASSIUM CHLORIDE, DEXTROSE MONOHYDRATE AND SODIUM CHLORIDE: 150; 5; 450 INJECTION, SOLUTION INTRAVENOUS at 02:03

## 2018-10-24 RX ADMIN — SODIUM CHLORIDE 5.5 UNITS/HR: 9 INJECTION, SOLUTION INTRAVENOUS at 06:26

## 2018-10-24 RX ADMIN — Medication 10 MEQ: at 00:45

## 2018-10-24 RX ADMIN — POTASSIUM CHLORIDE 40 MEQ: 1500 TABLET, EXTENDED RELEASE ORAL at 22:02

## 2018-10-24 RX ADMIN — POTASSIUM CHLORIDE 40 MEQ: 1500 TABLET, EXTENDED RELEASE ORAL at 17:50

## 2018-10-24 RX ADMIN — Medication 10 MEQ: at 03:13

## 2018-10-24 RX ADMIN — Medication 10 MEQ: at 02:02

## 2018-10-24 RX ADMIN — POTASSIUM CHLORIDE 40 MEQ: 1500 TABLET, EXTENDED RELEASE ORAL at 08:47

## 2018-10-24 RX ADMIN — POTASSIUM CHLORIDE 40 MEQ: 1500 TABLET, EXTENDED RELEASE ORAL at 10:54

## 2018-10-24 RX ADMIN — INSULIN ASPART 8 UNITS: 100 INJECTION, SOLUTION INTRAVENOUS; SUBCUTANEOUS at 18:25

## 2018-10-24 RX ADMIN — POTASSIUM PHOSPHATE, MONOBASIC AND POTASSIUM PHOSPHATE, DIBASIC 25 MMOL: 224; 236 INJECTION, SOLUTION INTRAVENOUS at 18:37

## 2018-10-24 RX ADMIN — POTASSIUM PHOSPHATE, MONOBASIC AND POTASSIUM PHOSPHATE, DIBASIC 25 MMOL: 224; 236 INJECTION, SOLUTION INTRAVENOUS at 07:41

## 2018-10-24 RX ADMIN — MICONAZOLE NITRATE 1 APPLICATOR: 20 CREAM VAGINAL at 22:05

## 2018-10-24 RX ADMIN — POTASSIUM CHLORIDE 40 MEQ: 1500 TABLET, EXTENDED RELEASE ORAL at 12:51

## 2018-10-24 ASSESSMENT — ACTIVITIES OF DAILY LIVING (ADL)
SWALLOWING: 0-->SWALLOWS FOODS/LIQUIDS WITHOUT DIFFICULTY
RETIRED_COMMUNICATION: 0-->UNDERSTANDS/COMMUNICATES WITHOUT DIFFICULTY
BATHING: 0-->INDEPENDENT
ADLS_ACUITY_SCORE: 15
ADLS_ACUITY_SCORE: 11
FALL_HISTORY_WITHIN_LAST_SIX_MONTHS: NO
ADLS_ACUITY_SCORE: 11
RETIRED_EATING: 0-->INDEPENDENT
ADLS_ACUITY_SCORE: 11
ADLS_ACUITY_SCORE: 15
ADLS_ACUITY_SCORE: 11
TOILETING: 0-->INDEPENDENT
DRESS: 0-->INDEPENDENT
COGNITION: 0 - NO COGNITION ISSUES REPORTED

## 2018-10-24 NOTE — PHARMACY-ADMISSION MEDICATION HISTORY
Admission medication history interview status for the 10/23/2018  admission is complete. See EPIC admission navigator for prior to admission medications     Medication history source reliability:Good    Actions taken by pharmacist (provider contacted, etc):interviewed patient, who states she only uses insulin pump and no other meds     Additional medication history information not noted on PTA med list :pt does not know pump settings    Medication reconciliation/reorder completed by provider prior to medication history? No    Time spent in this activity: 5 minutes    Prior to Admission medications    Medication Sig Last Dose Taking? Auth Provider   UNKNOWN TO PATIENT Insulin pump, details unknown  Yes Unknown, Entered By History

## 2018-10-24 NOTE — PROVIDER NOTIFICATION
MD Notification    Notified Person: MD    Notified Person Name: Hector     Notification Date/Time: 10/24/18 0245    Notification Interaction: phone    Purpose of Notification: increased blood glucose, worsening CO2, anion gap    Orders Received: restart insulin gtt

## 2018-10-24 NOTE — PROGRESS NOTES
[facility]    Hospitalist Progress Note    Assessment & Plan   Bernie Dong is a 18 year old female who was admitted on 10/23/2018 with DKA related to Insulin Pump failure, but in part related to not doing route glucose monitoring:    Impression:   Principal Problem:    DKA w type 1 DM -- Hgb A1C 12.6 on 10/23/18   -- much improved now back on Lantus and Novolog (pump discontinued)   -- check Bicarb at 2 PM today, transfer to medical floor (stop telemetry)    Active Problems:    Hypokalemia -- persistent related to correction of acidosis   -- continue oral replacement, and check at 2 PM today    Nausea with vomiting -- resolved with treatment of DKA    Hypomagnesemia -- persistent   -- replace again today    Leukocytosis -- improved, no sign of infection   -- appears stress related    Severe Hypophosphatemia   -- IV replacement    Probable Vaginal Candidiasis -- suspect related to poor glucose control   -- start vaginal anti-fungal cream    Plan:  Discussed findings with Mother and patient.  Anticipate discharge tomorrow (they need to change flight time to tomorrow).  Will check labs at 2 PM today.  Discussed long term goals of better blood sugar control -- the need for ongoing outpatient education.      DVT Prophylaxis: Pneumatic Compression Devices  Code Status: No Order    Disposition: Expected discharge in 1 days once DKA resolved.    Hunter Matson MD  Pager [phone]  Cell Phone [phone]  Text Page (7am to 6pm)    Interval History   Has vaginal itching, otherwise feels better, no further nausea or vomiting, still quite thirsty.      Physical Exam   Temp: 97.3  F (36.3  C) Temp src: Oral BP: 113/83   Heart Rate: 122 Resp: 28 SpO2: 99 % O2 Device: None (Room air)    Vitals:    10/23/18 1000   Weight: 62.8 kg (138 lb 7.2 oz)     Vital Signs with Ranges  Temp:  [96.7  F (35.9  C)-99.2  F (37.3  C)] 97.3  F (36.3  C)  Heart Rate:  [116-149] 122  Resp:  [15-42] 28  BP:  (113-157)/() 113/83  SpO2:  [98 %-100 %] 99 %  I/O last 3 completed shifts:  In: 4476.04 [P.O.:1160; I.V.:3316.04]  Out: 4700 [Urine:4700]    # Pain Assessment:  Current Pain Score 10/24/2018   Patient currently in pain? denies   Pain score (0-10) -   Bernie diggs pain level was assessed and she currently denies pain.        Constitutional: Awake, alert, cooperative, no apparent distress  Respiratory: Clear to auscultation bilaterally, no crackles or wheezing  Cardiovascular: Regular rate and rhythm, normal S1 and S2, and no murmur noted  GI: Normal bowel sounds, soft, non-distended, non-tender  Extrem: No calf tenderness, no ankle edema  Neuro: Ox3, no focal motor or sensory deficits    Medications     dextrose 5% and 0.45% NaCl + KCl 20 mEq/L 100 mL/hr at 10/24/18 0203     insulin (regular) 4 mL/hr at 10/24/18 0752       sodium chloride 0.9%  1,000 mL Intravenous Once     insulin aspart   Subcutaneous QAM AC     insulin aspart   Subcutaneous Daily with lunch     insulin aspart   Subcutaneous Daily with supper     insulin glargine  20 Units Subcutaneous At Bedtime     potassium chloride  40 mEq Oral Q2H     sodium chloride   Intravenous Once       Data     Recent Labs  Lab 10/24/18  0545 10/24/18  0200 10/23/18  2130  10/23/18  0640   WBC 14.2*  --   --   --  28.0*   HGB 14.0  --   --   --  16.2*   MCV 82  --   --   --  86     --   --   --  432    143 147*  < > 137   POTASSIUM 2.6* 3.0* 3.3*  < > 2.5*   CHLORIDE 113* 111* 118*  < > 101   CO2 14* 8* 9*  < > 5*   BUN 8 10 11  < > 14   CR 0.50* 0.51* 0.52  < > 0.55   ANIONGAP 16* 24* 20*  < > 31*   ERICK 8.5 8.7 8.6  < > 9.5   * 239* 223*  < > 533*   ALBUMIN  --   --   --   --  4.0   PROTTOTAL  --   --   --   --  8.3   BILITOTAL  --   --   --   --  0.5   ALKPHOS  --   --   --   --  119   ALT  --   --   --   --  20   AST  --   --   --   --  8   LIPASE  --   --   --   --  399*   < > = values in this interval not displayed.    Phosphorus --  0.3  Magnesium -- 1.1     Imaging:   No results found for this or any previous visit (from the past 24 hour(s)).

## 2018-10-24 NOTE — SIGNIFICANT EVENT
Nurse called: family has concerns about flights home out of pocket costs, considering AMA.  Visited with mother. The patient is currently sleep and mother reports she has been up for days so it is good to see her rest.  I discussed risks of DKA including mortality and death, and how leaving too soon is dangerous. They are planning to recontact the airline and not planning on leaving against medical advice. I invited her to ask the daytime hospitalist for a letter if the airline needs more proof of medical emergency to reschedule their flights.    I also ordered q4 hour bmps while on the insulin infusion and potassium protocol

## 2018-10-24 NOTE — PLAN OF CARE
Problem: Diabetes, Type 1 (Adult)  Goal: Signs and Symptoms of Listed Potential Problems Will be Absent, Minimized or Managed (Diabetes, Type 1)  Signs and symptoms of listed potential problems will be absent, minimized or managed by discharge/transition of care (reference Diabetes, Type 1 (Adult) CPG).   Outcome: Improving  Pt is lethargic, but more awake this morning. BP stable, -130's. Insulin gtt stopped after lantus given, but restarted after BG continued to increase and BMP worsening. K+ replaced, Mag and phos to be replaced. Voiding well in commode. Mother present at bedside and very involved in care. Pt to possibly transfer to medical floor today.

## 2018-10-25 VITALS
SYSTOLIC BLOOD PRESSURE: 113 MMHG | HEART RATE: 140 BPM | TEMPERATURE: 96.5 F | OXYGEN SATURATION: 100 % | RESPIRATION RATE: 16 BRPM | WEIGHT: 138.45 LBS | DIASTOLIC BLOOD PRESSURE: 78 MMHG

## 2018-10-25 LAB
ANION GAP SERPL CALCULATED.3IONS-SCNC: 11 MMOL/L (ref 3–14)
BUN SERPL-MCNC: 9 MG/DL (ref 7–30)
CALCIUM SERPL-MCNC: 8 MG/DL (ref 8.5–10.1)
CHLORIDE SERPL-SCNC: 105 MMOL/L (ref 94–109)
CO2 SERPL-SCNC: 22 MMOL/L (ref 20–32)
CREAT SERPL-MCNC: 0.42 MG/DL (ref 0.52–1.04)
GFR SERPL CREATININE-BSD FRML MDRD: >90 ML/MIN/1.7M2
GLUCOSE BLDC GLUCOMTR-MCNC: 241 MG/DL (ref 70–99)
GLUCOSE BLDC GLUCOMTR-MCNC: 274 MG/DL (ref 70–99)
GLUCOSE BLDC GLUCOMTR-MCNC: 316 MG/DL (ref 70–99)
GLUCOSE BLDC GLUCOMTR-MCNC: 325 MG/DL (ref 70–99)
GLUCOSE BLDC GLUCOMTR-MCNC: 355 MG/DL (ref 70–99)
GLUCOSE SERPL-MCNC: 324 MG/DL (ref 70–99)
MAGNESIUM SERPL-MCNC: 1.4 MG/DL (ref 1.6–2.3)
PHOSPHATE SERPL-MCNC: 1.7 MG/DL (ref 2.5–4.5)
PHOSPHATE SERPL-MCNC: 2 MG/DL (ref 2.5–4.5)
PHOSPHATE SERPL-MCNC: 2.7 MG/DL (ref 2.5–4.5)
POTASSIUM SERPL-SCNC: 3.6 MMOL/L (ref 3.4–5.3)
SODIUM SERPL-SCNC: 138 MMOL/L (ref 133–144)

## 2018-10-25 PROCEDURE — 25000125 ZZHC RX 250: Performed by: INTERNAL MEDICINE

## 2018-10-25 PROCEDURE — 83735 ASSAY OF MAGNESIUM: CPT | Performed by: INTERNAL MEDICINE

## 2018-10-25 PROCEDURE — 36415 COLL VENOUS BLD VENIPUNCTURE: CPT | Performed by: INTERNAL MEDICINE

## 2018-10-25 PROCEDURE — 25000132 ZZH RX MED GY IP 250 OP 250 PS 637: Performed by: INTERNAL MEDICINE

## 2018-10-25 PROCEDURE — 99239 HOSP IP/OBS DSCHRG MGMT >30: CPT | Performed by: INTERNAL MEDICINE

## 2018-10-25 PROCEDURE — 84100 ASSAY OF PHOSPHORUS: CPT | Performed by: INTERNAL MEDICINE

## 2018-10-25 PROCEDURE — 25000128 H RX IP 250 OP 636: Performed by: INTERNAL MEDICINE

## 2018-10-25 PROCEDURE — 80048 BASIC METABOLIC PNL TOTAL CA: CPT | Performed by: INTERNAL MEDICINE

## 2018-10-25 PROCEDURE — 00000146 ZZHCL STATISTIC GLUCOSE BY METER IP

## 2018-10-25 RX ORDER — POTASSIUM CHLORIDE 1500 MG/1
40 TABLET, EXTENDED RELEASE ORAL ONCE
Status: COMPLETED | OUTPATIENT
Start: 2018-10-25 | End: 2018-10-25

## 2018-10-25 RX ORDER — MICONAZOLE NITRATE 2 %
1 CREAM WITH APPLICATOR VAGINAL AT BEDTIME
Qty: 45 G | Refills: 0 | Status: SHIPPED | OUTPATIENT
Start: 2018-10-25 | End: 2018-11-01

## 2018-10-25 RX ORDER — BENZOCAINE/MENTHOL 6 MG-10 MG
LOZENGE MUCOUS MEMBRANE 2 TIMES DAILY PRN
Qty: 15 G | Refills: 0 | Status: SHIPPED | OUTPATIENT
Start: 2018-10-25

## 2018-10-25 RX ORDER — MAGNESIUM SULFATE HEPTAHYDRATE 40 MG/ML
2 INJECTION, SOLUTION INTRAVENOUS ONCE
Status: COMPLETED | OUTPATIENT
Start: 2018-10-25 | End: 2018-10-25

## 2018-10-25 RX ADMIN — MAGNESIUM SULFATE IN WATER 2 G: 40 INJECTION, SOLUTION INTRAVENOUS at 10:26

## 2018-10-25 RX ADMIN — POTASSIUM PHOSPHATE, MONOBASIC AND POTASSIUM PHOSPHATE, DIBASIC 20 MMOL: 224; 236 INJECTION, SOLUTION INTRAVENOUS at 03:46

## 2018-10-25 RX ADMIN — POTASSIUM CHLORIDE 40 MEQ: 1500 TABLET, EXTENDED RELEASE ORAL at 10:27

## 2018-10-25 ASSESSMENT — ACTIVITIES OF DAILY LIVING (ADL)
ADLS_ACUITY_SCORE: 11

## 2018-10-25 NOTE — PLAN OF CARE
Problem: Patient Care Overview  Goal: Plan of Care/Patient Progress Review  Outcome: No Change    Pt is A&Ox4. VSS ex tachy. Up SBA. Denies pain. MCFP diet. Bg overnight 325. Scars and scabs scattered over body. Phosphorus replaced on eves, rechecked 1.7, replaced again. Potassium replaced on eves, recheck in AM. Mother at bedside. Possible discharge today, education on blood glucose management. Slept between cares.

## 2018-10-25 NOTE — CONSULTS
Care Transition Initial Assessment - RN    Per chart review, pt is without insurance.   Met with: Patient and patient mother Kalie.  Per patient's mother Kalie, patient has Medicaid in [place] and is without her insurance card as they were visiting [place] on vacation.  Per pt's mother, they are unable to pay for discharging mediations.  Informed pt and pt's mother that financial counselor is unable to locate patient Medicaid number for [place].    Informed [facility] to fill lantus insulin and that [facility] would be used for this. Completed [facility] Funding Request form.     DATA   Principal Problem:    DKA w type 1 DM -- Hgb A1C 12.6 on 10/23/18  Active Problems:    Hypokalemia    Nausea with vomiting    Hypomagnesemia    Leukocytosis    Candidiasis of vagina       Cognitive Status: awake.        Contact information and PCP information verified: No  Lives With: parent(s)                    Insurance concerns: Other Patient has out of state [place] Medicaid-pt doesn't have insurance card and [facility] unable to locate in the system  ASSESSMENT  Patient currently receives the following services:  none        Identified issues/concerns regarding health management: lives out of state and having to be hospitalized prior to returning home.    PLAN  Financial costs for the patient include .  Patient given options and choices for discharge N/A .  Patient/family is agreeable to the plan?  N/A  Patient anticipates discharging to family's home until tomorrow when she will fly back home .        Patient anticipates needs for home equipment: No  Plan/Disposition: Home   Appointments: no appointments made      Care  (CTS) will continue to follow as needed.

## 2018-10-25 NOTE — PROGRESS NOTES
Discharge instructions reviewed with patient and mother. Questions answered.  Discharge medication and supplies sent up from pharmacy and given to patient.  Patient verbalized understanding of diabetic management, and her specific plan for safe management until she arrives home in [place].  IV removed.  Pt denies pain.  Doctor's notes for the airlines and [place] clinic/physicians were given to patient.  She was escorted to hospital entrance by [employer] with belongings in hand, to be transported home by grandmother.

## 2018-10-25 NOTE — DISCHARGE SUMMARY
"[facility]    Discharge Summary  Hospitalist    Date of Admission:  10/23/2018  Date of Discharge:  10/25/2018  Discharging Provider: Hunter Matson MD    Discharge Diagnoses   Principal Problem:    Severe DKA w type 1 DM since age 11, with uncontrolled diabetes at baseline -- with   Hgb A1C 12.6 on 10/23/18    Active Problems:    Severe dehydration    Severe Hypokalemia related to DKA and vomiting    Nausea with vomiting -- related to DKA    Hypomagnesemia -- related to DKA    Severe hypophosphatemia -- related to severe metabolic acidosis    Metabolic encephalopathy related to severe DKA    Leukocytosis -- appears stress related, no infection identified    Candidiasis of vagina    Multiple skin irritations arms and face, probable neurodermatitis       History of Present Illness   18 year old female with DM Type 1 for 7 years, frequent episodes of DKA so got [employer] insulin pump with sensor in March 2018, but continues to have control problems but mother admits they do not do glucometer checks (have just been going by the MiniMed sensor) and mother admits she does not know how to manage the insulin pump, and daughter too confused to do it herself.  They are visiting from [place], she has not felt good the last 3 days, but has not had high blood sugars (< 300), but yesterday had nausea and today nausea with vomiting and confusion, and glucose per med sensor 300 but in ER was 550.  Unclear if pump functioning appropriately, but the patient is not able to manage it at present, and mother not able to manage it as well.  She did have dysuria the last 2 days so mother did get \"Azo\" OTC (cranberry pills) to use. No reported fever or chills.  Hgb A1C today is 12.6. UA with 1 WBC, WBC 28.0 but no signs of infection, potassium 2.5, BiCarb 5, serum pH 6.90 consistent with severe DKA.  Neck supple but patient confused and unable to answer simple questions, and agitated.      Hospital Course "   Admitted to ICU, aggressive hydration with NS, given 1 amp of Bicarb, potassium aggressively replaced and started on insulin drip and insulin pump discontinued, and within 12 hours nausea and vomiting resolved and confusion clear.  No antibiotics given and WBC 14.2 the next day, and at discharge bicarb was 22, potassium 3.6.  Phosphorus did drop to 0.3, and magnesium 1.1, and both repeated replaced IV.  Was empirically started on Lantus 20 units at bedtime with Novolog 2 units per 15 gm carb intake, and additional sliding scale, and glucose at discharge was 184 tp 335.  Expect will need to gradually increase Novolog with meals, to fine tune her glucose control, and emphasized importance of glucose monitoring.  Her A1C of 12.6 reflects her lack of monitoring, and she had been relying on the insulin pump without doing any self-checking, and suspect the pump was sensing wrong and this triggered this episode.  Also emphasized the importance of knowing how to operate insulin pump as well for back-up when patient becomes confused.   Patient did have vaginal itching, suspect possible vaginal candidiasis related to uncontrolled glucoses, and antifungal cream started to use at bedtime for 3 to 7 days.  She was strongly urged to get another endocrinologist, bring insulin pump with appointment, and once demonstrates able to use pump accurately she could go back to using it, but needs to also check her blood sugars as back up.      Hunter Matson MD  Pager: [phone]  Cell Phone:  [phone]       Significant Results and Procedures   As above    Pending Results   These results will be followed up by Dr. Matson  Unresulted Labs Ordered in the Past 30 Days of this Admission     Date and Time Order Name Status Description    10/23/2018 0719 Blood culture Preliminary     10/23/2018 0719 Blood culture Preliminary           Code Status   Full Code       Primary Care Physician   Physician No Ref-Primary    Physical  Exam   Temp: 96.5  F (35.8  C) Temp src: Oral BP: 113/78   Heart Rate: 115 Resp: 16 SpO2: 100 % O2 Device: None (Room air)    Vitals:    10/23/18 1000   Weight: 62.8 kg (138 lb 7.2 oz)     Vital Signs with Ranges  Temp:  [96.5  F (35.8  C)-98.5  F (36.9  C)] 96.5  F (35.8  C)  Heart Rate:  [109-130] 115  Resp:  [16-28] 16  BP: ()/(53-82) 113/78  SpO2:  [98 %-100 %] 100 %  I/O last 3 completed shifts:  In: 2182.4 [P.O.:680; I.V.:1502.4]  Out: 1300 [Urine:1300]    Exam on discharge:   Alert, Ox3, cooperative    # Discharge Pain Plan:   - Patient currently has NO PAIN and is not being prescribed pain medications on discharge.      Discharge Disposition   Discharged to home  Condition at discharge: Good    Consultations This Hospital Stay   SOCIAL WORK IP CONSULT  SOCIAL WORK IP CONSULT    Time Spent on this Encounter   I spent a total of 35 minutes discharging this patient.     Discharge Orders     Reason for your hospital stay   DKA     Follow-up and recommended labs and tests    Follow up with primary care provider in 3-5 days, review blood sugar results and adjust insulin as needed to improve control.     Activity   Your activity upon discharge: activity as tolerated     Discharge Instructions   Call Dr. Morrow at Pager [phone] if questions, or Cell Phone [phone].     Full Code     Diet   Follow this diet upon discharge: Orders Placed This Encounter     Moderate Consistent CHO Diet       Discharge Medications   Current Discharge Medication List      START taking these medications    Details   hydrocortisone (CORTAID) 1 % cream Apply topically 2 times daily as needed for itching  Qty: 15 g, Refills: 0    Associated Diagnoses: Atopic neurodermatitis      insulin aspart (NOVOLOG FLEXPEN) 100 UNIT/ML injection Novolog Flexpen, 2 units per 15 gram carb unit before breakfast, lunch and dinner, and additional for glucometer 150-200, give 1 units SQ, 201-250 2 units, 251-300 3 units, 301-350 4 units, >350 5  units.  Qty: 15 mL, Refills: 0    Associated Diagnoses: Diabetic ketoacidosis without coma associated with type 1 diabetes mellitus (H)      insulin glargine (LANTUS SOLOSTAR) 100 UNIT/ML pen Inject 20 Units Subcutaneous At Bedtime  Qty: 15 mL, Refills: 3    Associated Diagnoses: Diabetic ketoacidosis without coma associated with type 1 diabetes mellitus (H)      miconazole (MICATIN) 2 % cream Place 1 applicator vaginally At Bedtime for 7 days  Qty: 45 g, Refills: 0    Associated Diagnoses: Candidiasis of vagina         STOP taking these medications       UNKNOWN TO PATIENT Comments:   Reason for Stopping:             Allergies   No Known Allergies  Data   Most Recent 3 CBC's:  Recent Labs   Lab Test  10/24/18   0545  10/23/18   0640   WBC  14.2*  28.0*   HGB  14.0  16.2*   MCV  82  86   PLT  263  432      Most Recent 3 BMP's:  Recent Labs   Lab Test  10/25/18   0717  10/24/18   1407  10/24/18   0545   NA  138  141  143   POTASSIUM  3.6  3.2*  2.6*   CHLORIDE  105  108  113*   CO2  22  19*  14*   BUN  9  8  8   CR  0.42*  0.40*  0.50*   ANIONGAP  11  14  16*   ERICK  8.0*  8.4*  8.5   GLC  324*  240*  197*     Most Recent 2 LFT's:  Recent Labs   Lab Test  10/23/18   0640   AST  8   ALT  20   ALKPHOS  119   BILITOTAL  0.5     Most Recent INR's and Anticoagulation Dosing History:  Anticoagulation Dose History     There is no flowsheet data to display.        Most Recent 3 Troponin's:No lab results found.  Most Recent Cholesterol Panel:No lab results found.  Most Recent 6 Bacteria Isolates From Any Culture (See EPIC Reports for Culture Details):  Recent Labs   Lab Test  10/23/18   0750  10/23/18   0748   CULT  No growth after 2 days  No growth after 2 days     Most Recent TSH, T4 and A1c Labs:  Recent Labs   Lab Test  10/23/18   0640   A1C  12.6*

## 2018-10-25 NOTE — PROGRESS NOTES
To Whom it May Concern,      Bernie Dong was admitted to [facility] on 10/23/2018 and discharged on 10/25/2018.  The patient was severely ill and missed her flight on 10/23/18, but now is better and medically stable to fly.      If questions, please call Dr. Morrow at Pager [phone], or Cell Phone [phone].      Sincerely,      Dr. Hunter Morrow  [facility]  Phone [phone]

## 2018-10-25 NOTE — PLAN OF CARE
Problem: Patient Care Overview  Goal: Plan of Care/Patient Progress Review  Outcome: Improving  pt a&o. Up with SBA. Denies pain. penitentiary diet, good appetite. bgm covered appropriately. Scars and scabs scattered over body. Phosphorus and potassium replaced this shift and rechecks scheduled. Plan is for possible discharge tomorrow. Mother at bedside and staying the night. Will continue to monitor.

## 2018-10-27 ENCOUNTER — HEALTH MAINTENANCE LETTER (OUTPATIENT)
Age: 18
End: 2018-10-27

## 2018-10-29 LAB
BACTERIA SPEC CULT: NO GROWTH
BACTERIA SPEC CULT: NO GROWTH
Lab: NORMAL
Lab: NORMAL
SPECIMEN SOURCE: NORMAL
SPECIMEN SOURCE: NORMAL

## 2019-10-02 ENCOUNTER — HEALTH MAINTENANCE LETTER (OUTPATIENT)
Age: 19
End: 2019-10-02

## 2019-10-31 ENCOUNTER — HEALTH MAINTENANCE LETTER (OUTPATIENT)
Age: 19
End: 2019-10-31

## 2020-03-22 ENCOUNTER — HEALTH MAINTENANCE LETTER (OUTPATIENT)
Age: 20
End: 2020-03-22

## 2021-01-15 ENCOUNTER — HEALTH MAINTENANCE LETTER (OUTPATIENT)
Age: 21
End: 2021-01-15

## 2021-05-15 ENCOUNTER — HEALTH MAINTENANCE LETTER (OUTPATIENT)
Age: 21
End: 2021-05-15

## 2021-09-04 ENCOUNTER — HEALTH MAINTENANCE LETTER (OUTPATIENT)
Age: 21
End: 2021-09-04

## 2021-12-25 ENCOUNTER — HEALTH MAINTENANCE LETTER (OUTPATIENT)
Age: 21
End: 2021-12-25

## 2022-02-19 ENCOUNTER — HEALTH MAINTENANCE LETTER (OUTPATIENT)
Age: 22
End: 2022-02-19

## 2022-04-16 ENCOUNTER — HEALTH MAINTENANCE LETTER (OUTPATIENT)
Age: 22
End: 2022-04-16

## 2022-07-30 ENCOUNTER — TELEPHONE ENCOUNTER (OUTPATIENT)
Age: 22
End: 2022-07-30

## 2022-07-31 ENCOUNTER — TELEPHONE ENCOUNTER (OUTPATIENT)
Age: 22
End: 2022-07-31

## 2022-08-06 ENCOUNTER — HEALTH MAINTENANCE LETTER (OUTPATIENT)
Age: 22
End: 2022-08-06

## 2022-10-22 ENCOUNTER — HEALTH MAINTENANCE LETTER (OUTPATIENT)
Age: 22
End: 2022-10-22

## 2022-12-10 ENCOUNTER — HEALTH MAINTENANCE LETTER (OUTPATIENT)
Age: 22
End: 2022-12-10

## 2023-04-01 ENCOUNTER — HEALTH MAINTENANCE LETTER (OUTPATIENT)
Age: 23
End: 2023-04-01

## 2023-08-27 ENCOUNTER — HEALTH MAINTENANCE LETTER (OUTPATIENT)
Age: 23
End: 2023-08-27

## 2024-01-14 ENCOUNTER — HEALTH MAINTENANCE LETTER (OUTPATIENT)
Age: 24
End: 2024-01-14

## 2024-06-02 ENCOUNTER — HEALTH MAINTENANCE LETTER (OUTPATIENT)
Age: 24
End: 2024-06-02